# Patient Record
Sex: FEMALE | Race: WHITE | URBAN - METROPOLITAN AREA
[De-identification: names, ages, dates, MRNs, and addresses within clinical notes are randomized per-mention and may not be internally consistent; named-entity substitution may affect disease eponyms.]

---

## 2017-10-23 ENCOUNTER — TELEPHONE (OUTPATIENT)
Dept: FAMILY MEDICINE CLINIC | Facility: CLINIC | Age: 57
End: 2017-10-23

## 2017-10-24 NOTE — TELEPHONE ENCOUNTER
Please print for Diana to remove Dr. Viva Seip as PCP. Patient moved to [place] several years ago and to my knowledge, has since moved to [place]. Will no longer be coming here.